# Patient Record
Sex: FEMALE | Race: WHITE | NOT HISPANIC OR LATINO | Employment: FULL TIME | URBAN - METROPOLITAN AREA
[De-identification: names, ages, dates, MRNs, and addresses within clinical notes are randomized per-mention and may not be internally consistent; named-entity substitution may affect disease eponyms.]

---

## 2021-05-30 ENCOUNTER — RECORDS - HEALTHEAST (OUTPATIENT)
Dept: ADMINISTRATIVE | Facility: CLINIC | Age: 31
End: 2021-05-30

## 2022-07-05 ENCOUNTER — LAB REQUISITION (OUTPATIENT)
Dept: LAB | Facility: CLINIC | Age: 32
End: 2022-07-05
Payer: COMMERCIAL

## 2022-07-05 DIAGNOSIS — Z32.01 ENCOUNTER FOR PREGNANCY TEST, RESULT POSITIVE: ICD-10-CM

## 2022-07-05 LAB
ABO/RH(D): NORMAL
ANTIBODY SCREEN: NEGATIVE
BASOPHILS # BLD AUTO: 0.1 10E3/UL (ref 0–0.2)
BASOPHILS NFR BLD AUTO: 0 %
EOSINOPHIL # BLD AUTO: 0.1 10E3/UL (ref 0–0.7)
EOSINOPHIL NFR BLD AUTO: 1 %
ERYTHROCYTE [DISTWIDTH] IN BLOOD BY AUTOMATED COUNT: 12.2 % (ref 10–15)
HCT VFR BLD AUTO: 38.9 % (ref 35–47)
HGB BLD-MCNC: 13.4 G/DL (ref 11.7–15.7)
IMM GRANULOCYTES # BLD: 0 10E3/UL
IMM GRANULOCYTES NFR BLD: 0 %
LYMPHOCYTES # BLD AUTO: 2.7 10E3/UL (ref 0.8–5.3)
LYMPHOCYTES NFR BLD AUTO: 21 %
MCH RBC QN AUTO: 30.7 PG (ref 26.5–33)
MCHC RBC AUTO-ENTMCNC: 34.4 G/DL (ref 31.5–36.5)
MCV RBC AUTO: 89 FL (ref 78–100)
MONOCYTES # BLD AUTO: 0.9 10E3/UL (ref 0–1.3)
MONOCYTES NFR BLD AUTO: 7 %
NEUTROPHILS # BLD AUTO: 9 10E3/UL (ref 1.6–8.3)
NEUTROPHILS NFR BLD AUTO: 71 %
NRBC # BLD AUTO: 0 10E3/UL
NRBC BLD AUTO-RTO: 0 /100
PLATELET # BLD AUTO: 303 10E3/UL (ref 150–450)
RBC # BLD AUTO: 4.36 10E6/UL (ref 3.8–5.2)
SPECIMEN EXPIRATION DATE: NORMAL
SPECIMEN EXPIRATION DATE: NORMAL
WBC # BLD AUTO: 12.7 10E3/UL (ref 4–11)

## 2022-07-05 PROCEDURE — 87086 URINE CULTURE/COLONY COUNT: CPT | Mod: ORL | Performed by: FAMILY MEDICINE

## 2022-07-05 PROCEDURE — 87389 HIV-1 AG W/HIV-1&-2 AB AG IA: CPT | Mod: ORL | Performed by: FAMILY MEDICINE

## 2022-07-05 PROCEDURE — 87591 N.GONORRHOEAE DNA AMP PROB: CPT | Mod: ORL | Performed by: FAMILY MEDICINE

## 2022-07-05 PROCEDURE — 86780 TREPONEMA PALLIDUM: CPT | Mod: ORL | Performed by: FAMILY MEDICINE

## 2022-07-05 PROCEDURE — 86850 RBC ANTIBODY SCREEN: CPT | Mod: ORL | Performed by: FAMILY MEDICINE

## 2022-07-05 PROCEDURE — 86901 BLOOD TYPING SEROLOGIC RH(D): CPT | Mod: ORL | Performed by: FAMILY MEDICINE

## 2022-07-05 PROCEDURE — 87340 HEPATITIS B SURFACE AG IA: CPT | Mod: ORL | Performed by: FAMILY MEDICINE

## 2022-07-05 PROCEDURE — 86803 HEPATITIS C AB TEST: CPT | Mod: ORL | Performed by: FAMILY MEDICINE

## 2022-07-05 PROCEDURE — 85025 COMPLETE CBC W/AUTO DIFF WBC: CPT | Mod: ORL | Performed by: FAMILY MEDICINE

## 2022-07-05 PROCEDURE — 86762 RUBELLA ANTIBODY: CPT | Mod: ORL | Performed by: FAMILY MEDICINE

## 2022-07-06 LAB
C TRACH DNA SPEC QL PROBE+SIG AMP: NEGATIVE
HBV SURFACE AG SERPL QL IA: NONREACTIVE
HCV AB SERPL QL IA: NONREACTIVE
HIV 1+2 AB+HIV1 P24 AG SERPL QL IA: NONREACTIVE
N GONORRHOEA DNA SPEC QL NAA+PROBE: NEGATIVE
RUBV IGG SERPL QL IA: 9.06 INDEX
RUBV IGG SERPL QL IA: POSITIVE
T PALLIDUM AB SER QL: NONREACTIVE

## 2022-07-07 LAB — BACTERIA UR CULT: NO GROWTH

## 2022-08-16 ENCOUNTER — TRANSFERRED RECORDS (OUTPATIENT)
Dept: HEALTH INFORMATION MANAGEMENT | Facility: CLINIC | Age: 32
End: 2022-08-16

## 2022-09-13 ENCOUNTER — TRANSFERRED RECORDS (OUTPATIENT)
Dept: HEALTH INFORMATION MANAGEMENT | Facility: CLINIC | Age: 32
End: 2022-09-13

## 2022-11-10 ENCOUNTER — LAB REQUISITION (OUTPATIENT)
Dept: LAB | Facility: CLINIC | Age: 32
End: 2022-11-10
Payer: COMMERCIAL

## 2022-11-10 ENCOUNTER — TRANSFERRED RECORDS (OUTPATIENT)
Dept: HEALTH INFORMATION MANAGEMENT | Facility: CLINIC | Age: 32
End: 2022-11-10

## 2022-11-10 DIAGNOSIS — Z34.02 ENCOUNTER FOR SUPERVISION OF NORMAL FIRST PREGNANCY, SECOND TRIMESTER: ICD-10-CM

## 2022-11-10 PROCEDURE — 86780 TREPONEMA PALLIDUM: CPT | Mod: ORL | Performed by: FAMILY MEDICINE

## 2022-11-11 LAB — T PALLIDUM AB SER QL: NONREACTIVE

## 2022-12-13 ENCOUNTER — TRANSFERRED RECORDS (OUTPATIENT)
Dept: HEALTH INFORMATION MANAGEMENT | Facility: CLINIC | Age: 32
End: 2022-12-13

## 2023-01-17 ENCOUNTER — LAB REQUISITION (OUTPATIENT)
Dept: LAB | Facility: CLINIC | Age: 33
End: 2023-01-17
Payer: COMMERCIAL

## 2023-01-17 ENCOUNTER — TRANSFERRED RECORDS (OUTPATIENT)
Dept: HEALTH INFORMATION MANAGEMENT | Facility: CLINIC | Age: 33
End: 2023-01-17

## 2023-01-17 DIAGNOSIS — Z34.03 ENCOUNTER FOR SUPERVISION OF NORMAL FIRST PREGNANCY, THIRD TRIMESTER: ICD-10-CM

## 2023-01-17 PROCEDURE — 87653 STREP B DNA AMP PROBE: CPT | Mod: ORL | Performed by: FAMILY MEDICINE

## 2023-01-18 LAB — GP B STREP DNA SPEC QL NAA+PROBE: NEGATIVE

## 2023-01-24 ENCOUNTER — TRANSFERRED RECORDS (OUTPATIENT)
Dept: HEALTH INFORMATION MANAGEMENT | Facility: CLINIC | Age: 33
End: 2023-01-24
Payer: COMMERCIAL

## 2023-02-13 ENCOUNTER — TRANSFERRED RECORDS (OUTPATIENT)
Dept: HEALTH INFORMATION MANAGEMENT | Facility: CLINIC | Age: 33
End: 2023-02-13
Payer: COMMERCIAL

## 2023-02-18 ENCOUNTER — HOSPITAL ENCOUNTER (INPATIENT)
Facility: HOSPITAL | Age: 33
LOS: 3 days | Discharge: HOME | End: 2023-02-21
Attending: FAMILY MEDICINE | Admitting: FAMILY MEDICINE
Payer: COMMERCIAL

## 2023-02-18 PROBLEM — Z37.9 NORMAL LABOR: Status: ACTIVE | Noted: 2023-02-18

## 2023-02-18 LAB
ABO/RH(D): NORMAL
ANTIBODY SCREEN: NEGATIVE
SPECIMEN EXPIRATION DATE: NORMAL

## 2023-02-18 PROCEDURE — 120N000001 HC R&B MED SURG/OB

## 2023-02-18 RX ORDER — KETOROLAC TROMETHAMINE 30 MG/ML
30 INJECTION, SOLUTION INTRAMUSCULAR; INTRAVENOUS
Status: DISCONTINUED | OUTPATIENT
Start: 2023-02-18 | End: 2023-02-21 | Stop reason: HOSPADM

## 2023-02-18 RX ORDER — OXYTOCIN 10 [USP'U]/ML
10 INJECTION, SOLUTION INTRAMUSCULAR; INTRAVENOUS
Status: DISCONTINUED | OUTPATIENT
Start: 2023-02-18 | End: 2023-02-19 | Stop reason: HOSPADM

## 2023-02-18 RX ORDER — PROCHLORPERAZINE MALEATE 10 MG
10 TABLET ORAL EVERY 6 HOURS PRN
Status: DISCONTINUED | OUTPATIENT
Start: 2023-02-18 | End: 2023-02-19 | Stop reason: HOSPADM

## 2023-02-18 RX ORDER — FENTANYL CITRATE 50 UG/ML
100 INJECTION, SOLUTION INTRAMUSCULAR; INTRAVENOUS
Status: DISCONTINUED | OUTPATIENT
Start: 2023-02-18 | End: 2023-02-19 | Stop reason: HOSPADM

## 2023-02-18 RX ORDER — OXYTOCIN/0.9 % SODIUM CHLORIDE 30/500 ML
100-340 PLASTIC BAG, INJECTION (ML) INTRAVENOUS CONTINUOUS PRN
Status: DISCONTINUED | OUTPATIENT
Start: 2023-02-18 | End: 2023-02-21 | Stop reason: HOSPADM

## 2023-02-18 RX ORDER — METOCLOPRAMIDE HYDROCHLORIDE 5 MG/ML
10 INJECTION INTRAMUSCULAR; INTRAVENOUS EVERY 6 HOURS PRN
Status: DISCONTINUED | OUTPATIENT
Start: 2023-02-18 | End: 2023-02-19 | Stop reason: HOSPADM

## 2023-02-18 RX ORDER — MISOPROSTOL 200 UG/1
400 TABLET ORAL
Status: DISCONTINUED | OUTPATIENT
Start: 2023-02-18 | End: 2023-02-19 | Stop reason: HOSPADM

## 2023-02-18 RX ORDER — ONDANSETRON 4 MG/1
4 TABLET, ORALLY DISINTEGRATING ORAL EVERY 6 HOURS PRN
Status: DISCONTINUED | OUTPATIENT
Start: 2023-02-18 | End: 2023-02-19 | Stop reason: HOSPADM

## 2023-02-18 RX ORDER — OXYTOCIN 10 [USP'U]/ML
10 INJECTION, SOLUTION INTRAMUSCULAR; INTRAVENOUS
Status: DISCONTINUED | OUTPATIENT
Start: 2023-02-18 | End: 2023-02-21 | Stop reason: HOSPADM

## 2023-02-18 RX ORDER — PROCHLORPERAZINE 25 MG
25 SUPPOSITORY, RECTAL RECTAL EVERY 12 HOURS PRN
Status: DISCONTINUED | OUTPATIENT
Start: 2023-02-18 | End: 2023-02-19 | Stop reason: HOSPADM

## 2023-02-18 RX ORDER — NALOXONE HYDROCHLORIDE 0.4 MG/ML
0.4 INJECTION, SOLUTION INTRAMUSCULAR; INTRAVENOUS; SUBCUTANEOUS
Status: DISCONTINUED | OUTPATIENT
Start: 2023-02-18 | End: 2023-02-19 | Stop reason: HOSPADM

## 2023-02-18 RX ORDER — NALOXONE HYDROCHLORIDE 0.4 MG/ML
0.2 INJECTION, SOLUTION INTRAMUSCULAR; INTRAVENOUS; SUBCUTANEOUS
Status: DISCONTINUED | OUTPATIENT
Start: 2023-02-18 | End: 2023-02-19 | Stop reason: HOSPADM

## 2023-02-18 RX ORDER — METOCLOPRAMIDE 10 MG/1
10 TABLET ORAL EVERY 6 HOURS PRN
Status: DISCONTINUED | OUTPATIENT
Start: 2023-02-18 | End: 2023-02-19 | Stop reason: HOSPADM

## 2023-02-18 RX ORDER — CARBOPROST TROMETHAMINE 250 UG/ML
250 INJECTION, SOLUTION INTRAMUSCULAR
Status: DISCONTINUED | OUTPATIENT
Start: 2023-02-18 | End: 2023-02-19 | Stop reason: HOSPADM

## 2023-02-18 RX ORDER — VITAMIN A ACETATE, .BETA.-CAROTENE, ASCORBIC ACID, CHOLECALCIFEROL, .ALPHA.-TOCOPHEROL ACETATE, DL-, THIAMINE MONONITRATE, RIBOFLAVIN, NIACINAMIDE, PYRIDOXINE HYDROCHLORIDE, FOLIC ACID, CYANOCOBALAMIN, CALCIUM CARBONATE, FERROUS FUMARATE, ZINC OXIDE, AND CUPRIC OXIDE 2000; 2000; 120; 400; 22; 1.84; 3; 20; 10; 1; 12; 200; 27; 25; 2 [IU]/1; [IU]/1; MG/1; [IU]/1; MG/1; MG/1; MG/1; MG/1; MG/1; MG/1; UG/1; MG/1; MG/1; MG/1; MG/1
1 TABLET ORAL DAILY
COMMUNITY

## 2023-02-18 RX ORDER — IBUPROFEN 800 MG/1
800 TABLET, FILM COATED ORAL
Status: DISCONTINUED | OUTPATIENT
Start: 2023-02-18 | End: 2023-02-21 | Stop reason: HOSPADM

## 2023-02-18 RX ORDER — ONDANSETRON 2 MG/ML
4 INJECTION INTRAMUSCULAR; INTRAVENOUS EVERY 6 HOURS PRN
Status: DISCONTINUED | OUTPATIENT
Start: 2023-02-18 | End: 2023-02-19 | Stop reason: HOSPADM

## 2023-02-18 RX ORDER — OXYTOCIN/0.9 % SODIUM CHLORIDE 30/500 ML
340 PLASTIC BAG, INJECTION (ML) INTRAVENOUS CONTINUOUS PRN
Status: DISCONTINUED | OUTPATIENT
Start: 2023-02-18 | End: 2023-02-19 | Stop reason: HOSPADM

## 2023-02-18 RX ORDER — CITRIC ACID/SODIUM CITRATE 334-500MG
30 SOLUTION, ORAL ORAL
Status: DISCONTINUED | OUTPATIENT
Start: 2023-02-18 | End: 2023-02-19 | Stop reason: HOSPADM

## 2023-02-18 RX ORDER — METHYLERGONOVINE MALEATE 0.2 MG/ML
200 INJECTION INTRAVENOUS
Status: DISCONTINUED | OUTPATIENT
Start: 2023-02-18 | End: 2023-02-19 | Stop reason: HOSPADM

## 2023-02-18 RX ORDER — MISOPROSTOL 200 UG/1
800 TABLET ORAL
Status: DISCONTINUED | OUTPATIENT
Start: 2023-02-18 | End: 2023-02-19 | Stop reason: HOSPADM

## 2023-02-18 ASSESSMENT — ACTIVITIES OF DAILY LIVING (ADL): ADLS_ACUITY_SCORE: 35

## 2023-02-19 ENCOUNTER — ANESTHESIA (OUTPATIENT)
Dept: OBGYN | Facility: HOSPITAL | Age: 33
End: 2023-02-19
Payer: COMMERCIAL

## 2023-02-19 ENCOUNTER — ANESTHESIA EVENT (OUTPATIENT)
Dept: OBGYN | Facility: HOSPITAL | Age: 33
End: 2023-02-19
Payer: COMMERCIAL

## 2023-02-19 LAB
HOLD SPECIMEN: NORMAL
T PALLIDUM AB SER QL: NONREACTIVE

## 2023-02-19 PROCEDURE — 250N000011 HC RX IP 250 OP 636: Performed by: ANESTHESIOLOGY

## 2023-02-19 PROCEDURE — 36415 COLL VENOUS BLD VENIPUNCTURE: CPT | Performed by: FAMILY MEDICINE

## 2023-02-19 PROCEDURE — 86850 RBC ANTIBODY SCREEN: CPT | Performed by: FAMILY MEDICINE

## 2023-02-19 PROCEDURE — 3E0R3BZ INTRODUCTION OF ANESTHETIC AGENT INTO SPINAL CANAL, PERCUTANEOUS APPROACH: ICD-10-PCS | Performed by: FAMILY MEDICINE

## 2023-02-19 PROCEDURE — 250N000011 HC RX IP 250 OP 636: Performed by: FAMILY MEDICINE

## 2023-02-19 PROCEDURE — 0KQM0ZZ REPAIR PERINEUM MUSCLE, OPEN APPROACH: ICD-10-PCS | Performed by: FAMILY MEDICINE

## 2023-02-19 PROCEDURE — 250N000009 HC RX 250: Performed by: FAMILY MEDICINE

## 2023-02-19 PROCEDURE — 370N000003 HC ANESTHESIA WARD SERVICE

## 2023-02-19 PROCEDURE — 86901 BLOOD TYPING SEROLOGIC RH(D): CPT | Performed by: FAMILY MEDICINE

## 2023-02-19 PROCEDURE — 722N000001 HC LABOR CARE VAGINAL DELIVERY SINGLE

## 2023-02-19 PROCEDURE — 258N000003 HC RX IP 258 OP 636: Performed by: FAMILY MEDICINE

## 2023-02-19 PROCEDURE — 86780 TREPONEMA PALLIDUM: CPT | Performed by: FAMILY MEDICINE

## 2023-02-19 PROCEDURE — 00HU33Z INSERTION OF INFUSION DEVICE INTO SPINAL CANAL, PERCUTANEOUS APPROACH: ICD-10-PCS | Performed by: FAMILY MEDICINE

## 2023-02-19 PROCEDURE — 120N000001 HC R&B MED SURG/OB

## 2023-02-19 RX ORDER — BUPIVACAINE HYDROCHLORIDE 2.5 MG/ML
INJECTION, SOLUTION EPIDURAL; INFILTRATION; INTRACAUDAL
Status: COMPLETED | OUTPATIENT
Start: 2023-02-19 | End: 2023-02-19

## 2023-02-19 RX ORDER — IBUPROFEN 800 MG/1
800 TABLET, FILM COATED ORAL EVERY 6 HOURS PRN
Status: DISCONTINUED | OUTPATIENT
Start: 2023-02-19 | End: 2023-02-21 | Stop reason: HOSPADM

## 2023-02-19 RX ORDER — ONDANSETRON 4 MG/1
4 TABLET, ORALLY DISINTEGRATING ORAL EVERY 6 HOURS PRN
Status: DISCONTINUED | OUTPATIENT
Start: 2023-02-19 | End: 2023-02-19 | Stop reason: HOSPADM

## 2023-02-19 RX ORDER — HYDROCORTISONE 25 MG/G
CREAM TOPICAL 3 TIMES DAILY PRN
Status: DISCONTINUED | OUTPATIENT
Start: 2023-02-19 | End: 2023-02-21 | Stop reason: HOSPADM

## 2023-02-19 RX ORDER — METHYLERGONOVINE MALEATE 0.2 MG/ML
200 INJECTION INTRAVENOUS
Status: DISCONTINUED | OUTPATIENT
Start: 2023-02-19 | End: 2023-02-21 | Stop reason: HOSPADM

## 2023-02-19 RX ORDER — ACETAMINOPHEN 325 MG/1
650 TABLET ORAL EVERY 4 HOURS PRN
Status: DISCONTINUED | OUTPATIENT
Start: 2023-02-19 | End: 2023-02-21 | Stop reason: HOSPADM

## 2023-02-19 RX ORDER — ONDANSETRON 2 MG/ML
4 INJECTION INTRAMUSCULAR; INTRAVENOUS EVERY 6 HOURS PRN
Status: DISCONTINUED | OUTPATIENT
Start: 2023-02-19 | End: 2023-02-19 | Stop reason: HOSPADM

## 2023-02-19 RX ORDER — OXYTOCIN/0.9 % SODIUM CHLORIDE 30/500 ML
340 PLASTIC BAG, INJECTION (ML) INTRAVENOUS CONTINUOUS PRN
Status: DISCONTINUED | OUTPATIENT
Start: 2023-02-19 | End: 2023-02-21 | Stop reason: HOSPADM

## 2023-02-19 RX ORDER — FENTANYL CITRATE-0.9 % NACL/PF 10 MCG/ML
100 PLASTIC BAG, INJECTION (ML) INTRAVENOUS EVERY 5 MIN PRN
Status: DISCONTINUED | OUTPATIENT
Start: 2023-02-19 | End: 2023-02-19 | Stop reason: HOSPADM

## 2023-02-19 RX ORDER — MISOPROSTOL 200 UG/1
400 TABLET ORAL
Status: DISCONTINUED | OUTPATIENT
Start: 2023-02-19 | End: 2023-02-21 | Stop reason: HOSPADM

## 2023-02-19 RX ORDER — MISOPROSTOL 200 UG/1
800 TABLET ORAL
Status: DISCONTINUED | OUTPATIENT
Start: 2023-02-19 | End: 2023-02-21 | Stop reason: HOSPADM

## 2023-02-19 RX ORDER — MODIFIED LANOLIN
OINTMENT (GRAM) TOPICAL
Status: DISCONTINUED | OUTPATIENT
Start: 2023-02-19 | End: 2023-02-21 | Stop reason: HOSPADM

## 2023-02-19 RX ORDER — OXYTOCIN 10 [USP'U]/ML
10 INJECTION, SOLUTION INTRAMUSCULAR; INTRAVENOUS
Status: DISCONTINUED | OUTPATIENT
Start: 2023-02-19 | End: 2023-02-21 | Stop reason: HOSPADM

## 2023-02-19 RX ORDER — BISACODYL 10 MG
10 SUPPOSITORY, RECTAL RECTAL DAILY PRN
Status: DISCONTINUED | OUTPATIENT
Start: 2023-02-19 | End: 2023-02-21 | Stop reason: HOSPADM

## 2023-02-19 RX ORDER — OXYTOCIN/0.9 % SODIUM CHLORIDE 30/500 ML
1-24 PLASTIC BAG, INJECTION (ML) INTRAVENOUS CONTINUOUS
Status: DISCONTINUED | OUTPATIENT
Start: 2023-02-19 | End: 2023-02-19 | Stop reason: HOSPADM

## 2023-02-19 RX ORDER — NALBUPHINE HYDROCHLORIDE 10 MG/ML
2.5-5 INJECTION, SOLUTION INTRAMUSCULAR; INTRAVENOUS; SUBCUTANEOUS EVERY 6 HOURS PRN
Status: DISCONTINUED | OUTPATIENT
Start: 2023-02-19 | End: 2023-02-21 | Stop reason: HOSPADM

## 2023-02-19 RX ORDER — CARBOPROST TROMETHAMINE 250 UG/ML
250 INJECTION, SOLUTION INTRAMUSCULAR
Status: DISCONTINUED | OUTPATIENT
Start: 2023-02-19 | End: 2023-02-21 | Stop reason: HOSPADM

## 2023-02-19 RX ORDER — LIDOCAINE 40 MG/G
CREAM TOPICAL
Status: DISCONTINUED | OUTPATIENT
Start: 2023-02-19 | End: 2023-02-19 | Stop reason: HOSPADM

## 2023-02-19 RX ORDER — SODIUM CHLORIDE, SODIUM LACTATE, POTASSIUM CHLORIDE, CALCIUM CHLORIDE 600; 310; 30; 20 MG/100ML; MG/100ML; MG/100ML; MG/100ML
INJECTION, SOLUTION INTRAVENOUS CONTINUOUS PRN
Status: DISCONTINUED | OUTPATIENT
Start: 2023-02-19 | End: 2023-02-19 | Stop reason: HOSPADM

## 2023-02-19 RX ORDER — FENTANYL/ROPIVACAINE/NS/PF 2MCG/ML-.1
PLASTIC BAG, INJECTION (ML) EPIDURAL
Status: DISCONTINUED | OUTPATIENT
Start: 2023-02-19 | End: 2023-02-19 | Stop reason: HOSPADM

## 2023-02-19 RX ORDER — DOCUSATE SODIUM 100 MG/1
100 CAPSULE, LIQUID FILLED ORAL DAILY
Status: DISCONTINUED | OUTPATIENT
Start: 2023-02-20 | End: 2023-02-21 | Stop reason: HOSPADM

## 2023-02-19 RX ADMIN — Medication 9 ML/HR: at 12:40

## 2023-02-19 RX ADMIN — Medication 2 MILLI-UNITS/MIN: at 16:55

## 2023-02-19 RX ADMIN — ONDANSETRON 4 MG: 2 INJECTION INTRAMUSCULAR; INTRAVENOUS at 09:18

## 2023-02-19 RX ADMIN — SODIUM CHLORIDE, POTASSIUM CHLORIDE, SODIUM LACTATE AND CALCIUM CHLORIDE 500 ML: 600; 310; 30; 20 INJECTION, SOLUTION INTRAVENOUS at 11:51

## 2023-02-19 RX ADMIN — SODIUM CHLORIDE, POTASSIUM CHLORIDE, SODIUM LACTATE AND CALCIUM CHLORIDE: 600; 310; 30; 20 INJECTION, SOLUTION INTRAVENOUS at 16:03

## 2023-02-19 RX ADMIN — FENTANYL CITRATE 100 MCG: 50 INJECTION, SOLUTION INTRAMUSCULAR; INTRAVENOUS at 00:33

## 2023-02-19 RX ADMIN — FENTANYL CITRATE 100 MCG: 50 INJECTION, SOLUTION INTRAMUSCULAR; INTRAVENOUS at 02:55

## 2023-02-19 RX ADMIN — FENTANYL CITRATE 100 MCG: 50 INJECTION, SOLUTION INTRAMUSCULAR; INTRAVENOUS at 05:07

## 2023-02-19 RX ADMIN — BUPIVACAINE HYDROCHLORIDE 10 ML: 2.5 INJECTION, SOLUTION EPIDURAL; INFILTRATION; INTRACAUDAL at 13:00

## 2023-02-19 RX ADMIN — METOCLOPRAMIDE 10 MG: 5 INJECTION, SOLUTION INTRAMUSCULAR; INTRAVENOUS at 05:15

## 2023-02-19 RX ADMIN — Medication: at 13:04

## 2023-02-19 RX ADMIN — BUPIVACAINE HYDROCHLORIDE 10 ML: 2.5 INJECTION, SOLUTION EPIDURAL; INFILTRATION; INTRACAUDAL at 12:36

## 2023-02-19 ASSESSMENT — ACTIVITIES OF DAILY LIVING (ADL)
VISION_MANAGEMENT: GLASSES
DOING_ERRANDS_INDEPENDENTLY_DIFFICULTY: NO
WEAR_GLASSES_OR_BLIND: YES
CONCENTRATING,_REMEMBERING_OR_MAKING_DECISIONS_DIFFICULTY: NO
NUMBER_OF_TIMES_PATIENT_HAS_FALLEN_WITHIN_LAST_SIX_MONTHS: 1
ADLS_ACUITY_SCORE: 20
DRESSING/BATHING_DIFFICULTY: NO
ADLS_ACUITY_SCORE: 20
CHANGE_IN_FUNCTIONAL_STATUS_SINCE_ONSET_OF_CURRENT_ILLNESS/INJURY: NO
ADLS_ACUITY_SCORE: 20
DIFFICULTY_EATING/SWALLOWING: NO
TOILETING_ISSUES: NO
WALKING_OR_CLIMBING_STAIRS_DIFFICULTY: NO
ADLS_ACUITY_SCORE: 20
ADLS_ACUITY_SCORE: 20
HEARING_DIFFICULTY_OR_DEAF: NO
DIFFICULTY_COMMUNICATING: NO
FALL_HISTORY_WITHIN_LAST_SIX_MONTHS: YES
ADLS_ACUITY_SCORE: 20

## 2023-02-19 NOTE — PROGRESS NOTES
At 13:12, FHT began a deceleration lasting 10 minutes, which spontaneously resolved. This was shortly after patient received epidural, however, BP WNL. SVE relatively unchanged at 7/70/-2. Dr. Hutchins notified. No further orders at this time.

## 2023-02-19 NOTE — PROGRESS NOTES
RN spoke with Dr. Hutchins re: status update. SVE unchanged from previous check. FHT Cat 1. Ctx q 5-7 minutes. Per MD, ok to start pitocin.    RN discussed POC with patient and her , and they verbalized agreement and understanding.

## 2023-02-19 NOTE — PROGRESS NOTES
RN at bedside. Patient up to the bathroom. Patient breathing through contractions. RN suggested trying tub vs another dose of fentanyl. Patient states she prefers to stay in bed for now.    RN will continue to monitor.

## 2023-02-19 NOTE — ANESTHESIA PROCEDURE NOTES
"Epidural catheter Procedure Note    Pre-Procedure   Staff -        Anesthesiologist:  Yasmin Ovalles MD       Performed By: anesthesiologist       Location: OB       Procedure Start/Stop Times: 2/19/2023 12:43 PM and 2/19/2023 1:06 PM       Pre-Anesthestic Checklist: patient identified, IV checked, risks and benefits discussed, informed consent, monitors and equipment checked, pre-op evaluation, at physician/surgeon's request and post-op pain management  Timeout:       Correct Patient: Yes        Correct Procedure: Yes        Correct Site: Yes        Correct Position: Yes   Procedure Documentation  Procedure: epidural catheter       Diagnosis: Labor Pain       Patient Position: sitting       Skin prep: Betadine       Local skin infiltrated with mL of 1% lidocaine.        Insertion Site: L4-5. (midline approach).       Technique: LORT air        Needle Type: [employer] needle       Needle Gauge: 18.        Needle Length (Inches): 3.5        Catheter: 20 G.          Catheter threaded easily.             # of attempts: 1 and  # of redirects:  0    Assessment/Narrative         Paresthesias: No.       Test dose of 3 mL lidocaine 1.5% w/ 1:200,000 epinephrine at.         Test dose negative, 3 minutes after injection, for signs of intravascular, subdural, or intrathecal injection.       Insertion/Infusion Method: LORT air       Aspiration negative for Heme or CSF via Epidural Catheter.    Medication(s) Administered   0.25% Bupivacaine PF (Epidural) - EPIDURAL   10 mL - 2/19/2023 12:36:00 PM  Medication Administration Time: 2/19/2023 12:43 PM      FOR [facility] ([facility]) ONLY:   Pain Team Contact information: please page the Pain Team Via [employer]. Search \"Pain\". During daytime hours, please page the attending first. At night please page the resident first.    "

## 2023-02-19 NOTE — PROGRESS NOTES
RN at bedside. Patient nauseous, and requesting IV zofran. Patient also decided to try using tub. RN assisted patient with filling tub, and to ambulate to bathroom. SO also assisting patient to get into tub. RN educated patient and her SO on availability of call light near tub. Both verbalized understanding.

## 2023-02-19 NOTE — PROGRESS NOTES
RN at bedside. Patient out of tub and laboring in bed. Per Dr. Hutchins, SVE 6-7 cm, BOW intact.     Patient states she wants more pain relief, but undecided on IV narcotic vs epidural. She states that she wants to wait until her SO returns to bedside to make the decision.

## 2023-02-19 NOTE — H&P
Maternal Admission H&P  [facility]  Date of Admission: 2023  Date of Service: 2023    Name      Ericka Hernández         1990  MRN       [id number]  PCP        Jose Alberto Hutchins         Assessment and Plan  33 year old  at 40w6d in labor.    Anticipate . Allow he rto labor down     Analgesia per patient's wishes    Group B strep: negative    Fentanyl or analgesia      Chief Complaint  contractions    HPI  Ericka Hernández is a 33 year old woman at 40w6d, based on an Estimated Date of Delivery: 2023. She presents with contractions q 3-5 minutes and 2 cm dilated. No HA or swelling. Her  Contractions began earlier that day and became more intense over time. She did have a NST last week that was reactive. Her pregnancy was uneventful, GBS negative.    Patient Active Problem List    Diagnosis Date Noted     Normal labor 2023     Priority: Medium      OB History    Para Term  AB Living   1 0 0 0 0 0   SAB IAB Ectopic Multiple Live Births   0 0 0 0 0      # Outcome Date GA Lbr [place]/2nd Weight Sex Delivery Anes PTL Lv   1 Current                Review of Systems   Negative except what is noted in HPI.     Past Medical History  History reviewed. No pertinent past medical history.     Past Surgical History  History reviewed. No pertinent surgical history.    Allergies  Patient has no known allergies.    Family History  History reviewed. No pertinent family history.    Social History  I have reviewed this patient's social history and updated it with pertinent information if needed. Ericka Hernández  reports that she quit smoking about 9 months ago. Her smoking use included cigarettes. She has never used smokeless tobacco. She reports that she does not currently use alcohol. She reports that she does not use drugs.    Prior to Admission Medication List  Medications Prior to Admission   Medication Sig Dispense Refill Last Dose      "Prenatal Vit-Fe Fumarate-FA (PNV PRENATAL PLUS MULTIVITAMIN) 27-1 MG TABS per tablet Take 1 tablet by mouth daily   2/18/2023        Allergies  No Known Allergies    There is no immunization history on file for this patient.     Physical Exam  /70 (BP Location: Right arm, Patient Position: Left side, Cuff Size: Adult Regular)   Pulse 66   Temp 97.9  F (36.6  C) (Oral)   Resp 18   Ht 1.702 m (5' 7\")   Wt 97.5 kg (215 lb)   SpO2 97%   Breastfeeding Yes   BMI 33.67 kg/m    HEART: RRR, no murmur  LUNGS: CTA bilaterally  Fetal Heart Tones:  moderate variablility, + accels, no decels and Category I (per nursing report)  CONTRACTIONS:  frequency q 3-4 minutes  CERVIX: 6-7/ 90%/ vtx/ 0  FLUID: None noted.  Fetal Presentation: vertex.    Labs  Group B Strep PCR   Date Value Ref Range Status   01/17/2023 Negative Negative Final     Comment:     Presumed negative for Streptococcus agalactiae (Group B Streptococcus) or the number of organisms may be below the limit of detection of the assay.      Antibody Screen   Date Value Ref Range Status   02/19/2023 Negative Negative Final     Hepatitis B Surface Antigen   Date Value Ref Range Status   07/05/2022 Nonreactive Nonreactive Final     Hemoglobin   Date Value Ref Range Status   07/05/2022 13.4 11.7 - 15.7 g/dL Final      Admission on 02/18/2023   Component Date Value Ref Range Status     ABO/RH(D) 02/19/2023 A POS   Final     Antibody Screen 02/19/2023 Negative  Negative Final     SPECIMEN EXPIRATION DATE 02/19/2023 [id number]   Final     Hold Specimen 02/19/2023 [facility]   Final        Completed by:   Jose Alberto Hutchins MD  [facility]  2/19/2023 10:34 AM  "

## 2023-02-19 NOTE — PLAN OF CARE
Problem: Plan of Care - These are the overarching goals to be used throughout the patient stay.    Goal: Optimal Comfort and Wellbeing  Intervention: Monitor Pain and Promote Comfort  Recent Flowsheet Documentation  Taken 2/19/2023 0033 by Miriam Wood RN  Pain Management Interventions: medication (see MAR)  Taken 2/18/2023 2324 by Miriam Wood RN  Pain Management Interventions: breathing exercises  Intervention: Provide Person-Centered Care  Recent Flowsheet Documentation  Taken 2/18/2023 2321 by Miriam Wood RN  Trust Relationship/Rapport:   care explained   emotional support provided  Goal: Readiness for Transition of Care  Intervention: Mutually Develop Transition Plan  Recent Flowsheet Documentation  Taken 2/19/2023 0010 by Miriam Wood RN  Equipment Currently Used at Home: none

## 2023-02-19 NOTE — ANESTHESIA PREPROCEDURE EVALUATION
Anesthesia Pre-Procedure Evaluation    Patient: Ericka Hernández   MRN: [id number] : 1990        Procedure : * No procedures listed *          History reviewed. No pertinent past medical history.   History reviewed. No pertinent surgical history.   No Known Allergies   Social History     Tobacco Use     Smoking status: Former     Types: Cigarettes     Quit date: 2022     Years since quittin.7     Smokeless tobacco: Never   Substance Use Topics     Alcohol use: Not Currently      Wt Readings from Last 1 Encounters:   23 97.5 kg (215 lb)        Anesthesia Evaluation   Pt has had prior anesthetic.     No history of anesthetic complications       ROS/MED HX  ENT/Pulmonary:       Neurologic:       Cardiovascular:       METS/Exercise Tolerance:     Hematologic:       Musculoskeletal:       GI/Hepatic:       Renal/Genitourinary:       Endo:     (+) Obesity,     Psychiatric/Substance Use:       Infectious Disease:       Malignancy:       Other:      (+) Possibly pregnant, ,            OUTSIDE LABS:  CBC:   Lab Results   Component Value Date    WBC 12.7 (H) 2022    HGB 13.4 2022    HCT 38.9 2022     2022     BMP: No results found for: NA, POTASSIUM, CHLORIDE, CO2, BUN, CR, GLC  COAGS: No results found for: PTT, INR, FIBR  POC: No results found for: BGM, HCG, HCGS  HEPATIC: No results found for: ALBUMIN, PROTTOTAL, ALT, AST, GGT, ALKPHOS, BILITOTAL, BILIDIRECT, BELIA  OTHER: No results found for: PH, LACT, A1C, SHARAN, PHOS, MAG, LIPASE, AMYLASE, TSH, T4, T3, CRP, SED    Anesthesia Plan    ASA Status:  2      Anesthesia Type: Epidural.              Consents    Anesthesia Plan(s) and associated risks, benefits, and realistic alternatives discussed. Questions answered and patient/representative(s) expressed understanding.    - Discussed:     - Discussed with:  Patient         Postoperative Care            Comments:                Yasmin Ovalles MD

## 2023-02-19 NOTE — PROGRESS NOTES
RN in room. Patient still in tub. Patient states that Dr. Hutchins had stopped in the room to check on her. Patient denies any further needs at this time.    RN will continue to monitor.

## 2023-02-20 LAB — HGB BLD-MCNC: 11.5 G/DL (ref 11.7–15.7)

## 2023-02-20 PROCEDURE — 120N000001 HC R&B MED SURG/OB

## 2023-02-20 PROCEDURE — 36415 COLL VENOUS BLD VENIPUNCTURE: CPT | Performed by: FAMILY MEDICINE

## 2023-02-20 PROCEDURE — 722N000001 HC LABOR CARE VAGINAL DELIVERY SINGLE

## 2023-02-20 PROCEDURE — 250N000013 HC RX MED GY IP 250 OP 250 PS 637: Performed by: FAMILY MEDICINE

## 2023-02-20 PROCEDURE — 85018 HEMOGLOBIN: CPT | Performed by: FAMILY MEDICINE

## 2023-02-20 RX ADMIN — IBUPROFEN 800 MG: 800 TABLET ORAL at 02:32

## 2023-02-20 RX ADMIN — IBUPROFEN 800 MG: 800 TABLET ORAL at 16:59

## 2023-02-20 RX ADMIN — DOCUSATE SODIUM 100 MG: 100 CAPSULE, LIQUID FILLED ORAL at 09:14

## 2023-02-20 ASSESSMENT — ACTIVITIES OF DAILY LIVING (ADL)
ADLS_ACUITY_SCORE: 20
ADLS_ACUITY_SCORE: 24
ADLS_ACUITY_SCORE: 24
ADLS_ACUITY_SCORE: 20

## 2023-02-20 NOTE — PLAN OF CARE
VSS, progressing WDL, denied pain, encouraged to breastfeed then pump, since Nipple is flat, uses nipple shield, lactations consult is received, continue to monitor per unit routine, no concern at the time.     Problem: Postpartum (Vaginal Delivery)  Goal: Hemostasis  Outcome: Adequate for Care Transition  Goal: Effective Urinary Elimination  Outcome: Adequate for Care Transition     Problem: Plan of Care - These are the overarching goals to be used throughout the patient stay.    Goal: Optimal Comfort and Wellbeing  Intervention: Provide Person-Centered Care  Recent Flowsheet Documentation  Taken 2/20/2023 0830 by Mehreen Brenner, RN  Trust Relationship/Rapport:    care explained    choices provided    thoughts/feelings acknowledged    questions answered    emotional support provided    questions encouraged     Problem: Labor  Goal: Acceptable Pain Control  Intervention: Support Labor Pain Coping and Management  Recent Flowsheet Documentation  Taken 2/20/2023 0830 by Mehreen Brenner, RN  Sensory Stimulation Regulation:    quiet environment promoted    care clustered

## 2023-02-20 NOTE — PROGRESS NOTES
"Maternal Postpartum Progress Note  [facility]  Date of Service: 2023    Name      Ericka Hernández         1990  MRN       [id number]  PCP        Jose Alberto Hutchins        Subjective:  The patient feels well:  Voiding without difficulty, lochia normal, tolerating normal diet, and passing flatus.  Pain is well controlled with current medications.  The patient has no emotional concerns.  No calf pain or swelling.  The baby is well and being fed by breast, working on latch.    Objective:  /85   Pulse 106   Temp 98.1  F (36.7  C) (Oral)   Resp 18   Ht 1.702 m (5' 7\")   Wt 97.5 kg (215 lb)   SpO2 97%   Breastfeeding Yes   BMI 33.67 kg/m    Lochia is minimal.  The uterine fundus is firm at umbilicus.  Urinary output is adequate. No calf tenderness.  No edema.    Labs:  Hemoglobin   Date Value Ref Range Status   2023 11.5 (L) 11.7 - 15.7 g/dL Final       Assessment:    -Postpartum Day 1 s/p vaginal delivery  -Normal postpartum course.      Plan:    -Continue current care.  -working on Brf, lactation to see    Completed by:   Paola Ozuna MD,   [facility]  2023 9:01 AM    "

## 2023-02-20 NOTE — L&D DELIVERY NOTE
[facility] Delivery Summary  [facility]  Date of Service: 2023    Name      Ericka Hernández         1990  MRN       [id number]  PCP        Jose Alberto Hutchins     DELIVERY NARRATIVE    On 2023 Ericka Hernández delivered a viable male infant at 40w6d with apgars of 8 and 10 via Vaginal, Spontaneous Delivery.    Ericka presented to Maternity Care on 2023 with active contractions and dilated to 2 cm. Her group B Strep (GBS) carrier status was negative.. She received oxytocin to 4 mU for augmentation when she stalled at 6-7 cm. She had SROM with clear fluid and she was complete shortly after.    Delivery was via vaginal, spontaneous  to a sterile field under intravenous ;epidural  anesthesia. Infant delivered in vertex  left  occiput  anterior  position. Shoulders delivered without difficulty. The baby was placed on the patient's abdomen.  Cord complications: none . Delayed cord clamping was performed.  The cord was doubly clamped and cut 3 vessels  were noted.     Placenta delivered at 2023  9:07 PM . Placental disposition was Hospital disposal . Fundal massage performed and fundus found to be  firm. The following uterotonics were given: Pitocin (IV). Perineum, vagina, cervix were inspected, and the following lacerations were noted: 2nd degree perineal. Lacerations were repaired in the usual fashion using 3-0 Vicryl. QBL: 400 mL. A vaginal laceration and periurethral laceration on her left were repaired also.     Excellent hemostasis was noted. Needle and sponge count correct. Infant and patient in delivery room in good and stable condition.   _________________    GA: 40w6d  GP:   Labor Complications: None   Additional Complications:    QBL: 400 mL  Delivery Type: Vaginal, Spontaneous   Duration of Ruptured Membranes: 0h 48m  GBS Status:   Group B Strep PCR   Date Value Ref Range Status   2023 Negative Negative Final     Comment:      Presumed negative for Streptococcus agalactiae (Group B Streptococcus) or the number of organisms may be below the limit of detection of the assay.       Weight:    Apgar scores: 8 , 10         Jailene Hernández [[id number]]    Labor Event Times    Labor onset date: 23 Onset time: 12:33 AM   Dilation complete date: 23 Complete time:  8:57 PM      Labor Events     labor?: No   steroids: None  Labor Type: Spontaneous  Predominate monitoring during 1st stage: continuous electronic fetal monitoring     Antibiotics received during labor?: No     Rupture identifier: Sac 1  Rupture date/time: 23   Rupture type: Spontaneous rupture of membranes occuring during spontaneous labor or augmentation  Fluid color: Clear  Fluid odor: Normal     Augmentation: Oxytocin  Indications for augmentation: Ineffective Contraction Pattern     Delivery/Placenta Date and Time    Delivery Date: 23 Delivery Time:  9:03 PM   Placenta Date/Time: 2023  9:07 PM  Oxytocin given at the time of delivery: after delivery of baby  Delivering clinician: Jose Alberto Hutchins   Other personnel present at delivery:  Provider Role   Covert, DOROTHY Pineda Clark, RN          Vaginal Counts     Initial count performed by 2 team members:  Two Team Members   Cuong Soto       Needles Suture Needles Sponges (RETIRED) Instruments   Initial counts   5    Added to count  2     Relief counts       Final counts  2 5          Placed during labor Accounted for at the end of labor   FSE NA NA   IUPC NA NA   Cervidil NA NA              Final count performed by 2 team members:  Two Team Members   Cuong Soto      Final count correct?: Yes     Apgars    Living status: Living   1 Minute 5 Minute 10 Minute 15 Minute 20 Minute   Skin color: 0  2       Heart rate: 2  2       Reflex irritability: 2  2       Muscle tone: 2  2       Respiratory effort: 2  2       Total: 8  10       Apgars assigned by:  REGINA DE LA CRUZ     Cord    Vessels: 3 Vessels    Cord Complications: None               Cord Blood Disposition: Discard    Gases Sent?: No    Delayed cord clamping?: Yes    Cord Clamping Delay (seconds):  seconds    Stem cell collection?: No       [place] Resuscitation    Methods: None  Output in Delivery Room: Voided      Measurements    Output in delivery room: Voided     Skin to Skin and Feeding Plan    Skin to skin initiation date/time: 1841    Skin to skin with: Mother  Skin to skin end date/time:        Labor Events and Shoulder Dystocia    Fetal Tracing Prior to Delivery: Category 2     Delivery (Maternal) (Provider to Complete) (588122)    Episiotomy: None  Perineal lacerations: 2nd    Periurethral laceration: bilateral Repaired?: Yes   Vaginal laceration?: Yes Repaired?: Yes   Repair suture: 3-0 Vicryl  Genital tract inspection done: Pos     Blood Loss  Mother: Ericka Hernández #[id number]   Start of Mother's Information    Delivery Blood Loss  23 0033 - 23 2154    Delivery QBL (mL) Hospital Encounter 400 mL    Total  400 mL         End of Mother's Information  Mother: Ericka Hernández #[id number]          Delivery - Provider to Complete ([id number])    Delivering clinician: Jose Alberto Hutchins  Delivery Type (Choose the 1 that will go to the Birth History): Vaginal, Spontaneous                   Other personnel:  Provider Role   Covert, DOROTHY Pineda Clark, RN                 Placenta    Date/Time: 2023  9:07 PM  Removal: Spontaneous  Disposition: Hospital disposal           Anesthesia    Method: INTRAVENOUS , Epidural  Cervical dilation at placement: 4-7     Analgesic:  BIRTH HISTORY: ANALGESIC   FENTANYL CITRATE IV             Presentation and Position    Presentation: Vertex    Position: Left Occiput Anterior                 Completed by:   Jose Alberto Hutchins MD  [facility]  2023 9:54 PM

## 2023-02-20 NOTE — LACTATION NOTE
"This note was copied from a baby's chart.  This writer met with Ericka \"Nirali\" per lactation order.  Infant has struggled to latch onto the breast.  Infant has latched with the nipple shield.  Nirali re-educated on hand expression.  She was able to hand express 0.5 ml colostrum from her right breast with ease.  Nirali and infant comfortably placed with infant in the football hold.  Latch technique discussed.  Infant able to latch, however, infant unable to sustain the latch.  Infant slips off the nipple and no swallows heard.  Several attempts made with no swallows hears.  Use of 24 mm nipple shield reviewed.  Nirali correctly placed this onto her right nipple.  Infant able to latch deeply and suck actively with several swallows heard.  Swallows increased when breast compression was used.  Infant then able to latch onto the other breast with the nipple shield (unable to sustain a latch without the nipple shield) and several swallows heard.  Infant content after the feeding.  Nirali knows to feed infant at least 8 times in 24 hours.  Try to latch without the nipple shield; however, if infant unable to swallow at the breast, then use the nipple shield.  Nirali verbalizes understanding of all education given.  She denies any further questions.      "

## 2023-02-20 NOTE — LACTATION NOTE
This note was copied from a baby's chart.  This writer knocked and went into the room to offer Ericka lactation services.  She was in bed with her baby soundly sleeping with baby in her arms.  This writer woke Ericka and informed her, for safety, infant needs to be in the crib while she sleeps.  This writer wrapped infant and placed infant on his back in the crib.      Ericka instructed to call when she desires lactation help for next breastfeeding session.

## 2023-02-20 NOTE — PLAN OF CARE
Problem: Labor  Goal: Stable Fetal Wellbeing  Outcome: Met  Intervention: Promote and Monitor Fetal Wellbeing  Flowsheets (Taken 2/19/2023 2313)  Fetal Wellbeing Promotion: fetal heart rate monitored  Goal: Effective Progression to Delivery  Outcome: Met  Intervention: Evaluate and Support Labor Progression  Flowsheets (Taken 2/19/2023 2313)  Shoulder Dystocia: (delivered) other (see comments)  Goal: Acceptable Pain Control  Outcome: Met

## 2023-02-20 NOTE — PLAN OF CARE
Problem: Plan of Care - These are the overarching goals to be used throughout the patient stay.    Goal: Plan of Care Review  Description: The Plan of Care Review/Shift note should be completed every shift.  The Outcome Evaluation is a brief statement about your assessment that the patient is improving, declining, or no change.  This information will be displayed automatically on your shift note.  Outcome: Progressing  Flowsheets (Taken 2/20/2023 0247)  Plan of Care Reviewed With: patient     Problem: Postpartum (Vaginal Delivery)  Goal: Effective Urinary Elimination  Outcome: Progressing   Patient was assisted to the bathroom, voided, wilton care done, New sanitory pad, witch hazel pads and spray applied to perineum. She received Motrin 800 mg for perineal pain she rates pain 4/10. Mom has flat nipple, electric breast pump offered. Parents bonding well with baby Cassidy Brooks RN

## 2023-02-21 VITALS
WEIGHT: 215 LBS | DIASTOLIC BLOOD PRESSURE: 82 MMHG | BODY MASS INDEX: 33.74 KG/M2 | HEIGHT: 67 IN | HEART RATE: 84 BPM | RESPIRATION RATE: 18 BRPM | SYSTOLIC BLOOD PRESSURE: 125 MMHG | OXYGEN SATURATION: 97 % | TEMPERATURE: 98.7 F

## 2023-02-21 PROCEDURE — 250N000013 HC RX MED GY IP 250 OP 250 PS 637: Performed by: FAMILY MEDICINE

## 2023-02-21 RX ORDER — DOCUSATE SODIUM 100 MG/1
100 CAPSULE, LIQUID FILLED ORAL DAILY
COMMUNITY
Start: 2023-02-22

## 2023-02-21 RX ORDER — ACETAMINOPHEN 325 MG/1
650 TABLET ORAL EVERY 4 HOURS PRN
COMMUNITY
Start: 2023-02-21

## 2023-02-21 RX ORDER — MODIFIED LANOLIN
OINTMENT (GRAM) TOPICAL
COMMUNITY
Start: 2023-02-21

## 2023-02-21 RX ORDER — IBUPROFEN 800 MG/1
800 TABLET, FILM COATED ORAL EVERY 6 HOURS PRN
COMMUNITY
Start: 2023-02-21

## 2023-02-21 RX ADMIN — DOCUSATE SODIUM 100 MG: 100 CAPSULE, LIQUID FILLED ORAL at 09:13

## 2023-02-21 ASSESSMENT — ACTIVITIES OF DAILY LIVING (ADL)
ADLS_ACUITY_SCORE: 20

## 2023-02-21 NOTE — PLAN OF CARE
"  Problem: Plan of Care - These are the overarching goals to be used throughout the patient stay.    Goal: Plan of Care Review  Description: The Plan of Care Review/Shift note should be completed every shift.  The Outcome Evaluation is a brief statement about your assessment that the patient is improving, declining, or no change.  This information will be displayed automatically on your shift note.  Outcome: Met  Goal: Patient-Specific Goal (Individualized)  Description: You can add care plan individualizations to a care plan. Examples of Individualization might be:  \"Parent requests to be called daily at 9am for status\", \"I have a hard time hearing out of my right ear\", or \"Do not touch me to wake me up as it startles me\".  Outcome: Met  Goal: Absence of Hospital-Acquired Illness or Injury  Outcome: Met  Goal: Optimal Comfort and Wellbeing  Outcome: Met  Intervention: Provide Person-Centered Care  Recent Flowsheet Documentation  Taken 2/21/2023 0852 by Mehreen Brenner, RN  Trust Relationship/Rapport:   questions answered   questions encouraged   choices provided   care explained  Goal: Readiness for Transition of Care  Outcome: Met     Problem: Postpartum (Vaginal Delivery)  Goal: Successful Maternal Role Transition  Outcome: Met  Goal: Absence of Infection Signs and Symptoms  Outcome: Met  Goal: Anesthesia/Sedation Recovery  Outcome: Met  Goal: Optimal Pain Control and Function  Outcome: Met     "

## 2023-02-21 NOTE — LACTATION NOTE
Lactation consultant to patient room for feeding attempt. Baby not in the room. Instructed parents to call me for next feeding, and they stated understanding.

## 2023-02-21 NOTE — DISCHARGE SUMMARY
Maternal Discharge Summary  [facility]  Date of Service: 2023    Name      Ericka Hernández         1990  MRN       [id number]  PCP        Jose Alberto Hutchins    Admit Date:  2023  Discharge Date:  2023    Principal Diagnosis:    Patient Active Problem List   Diagnosis     Vaginal delivery     Postpartum care and examination of lactating mother       Delivery Type: vaginal, spontaneous     Hospital Course:  Ericka Hernández is a 33 year old now  s/p vaginal, spontaneous  at 40w6d on 23.  The patient's hospital course was unremarkable.  She recovered as anticipated and experienced no post-delivery complications.  On discharge, her pain was well controlled.  Vaginal bleeding is normal.  Voiding without difficulty.  Ambulating well and tolerating a normal diet.  No fevers.       Conditions complicating Pregnancy:  none    Procedure(s) Performed:     Discharge Plan:   1. Discharge to Home. Condition at Discharge:  stable  2. Physical activity: Regular.  3. Diet:  Regular.  4. Home care nurse: declined by patient.  5. Contraception plan: undecided, will follow up at postpartum visit.  6. Follow up with Jose Alberto Pagan in 6 weeks.    Discharge Medications:   Current Discharge Medication List      START taking these medications    Details   acetaminophen (TYLENOL) 325 MG tablet Take 2 tablets (650 mg) by mouth every 4 hours as needed for mild pain or fever (greater than or equal to 38  C /100.4  F (oral) or 38.5  C/ 101.4  F (core).)    Associated Diagnoses: Vaginal delivery      docusate sodium (COLACE) 100 MG capsule Take 1 capsule (100 mg) by mouth daily    Associated Diagnoses: Vaginal delivery      ibuprofen (ADVIL/MOTRIN) 800 MG tablet Take 1 tablet (800 mg) by mouth every 6 hours as needed for other (cramping)    Associated Diagnoses: Vaginal delivery      lanolin ointment Apply topically every hour as needed for other (sore  "nipples)    Associated Diagnoses: Vaginal delivery      witch hazel-glycerin (TUCKS) pad Apply topically every hour as needed for hemorrhoids or other (episiotomy pain/itching)    Associated Diagnoses: Vaginal delivery         CONTINUE these medications which have NOT CHANGED    Details   Prenatal Vit-Fe Fumarate-FA (PNV PRENATAL PLUS MULTIVITAMIN) 27-1 MG TABS per tablet Take 1 tablet by mouth daily             Allergies: No Known Allergies    Discharge Exam:  /82 (BP Location: Right arm, Patient Position: Sitting, Cuff Size: Adult Regular)   Pulse 84   Temp 98.7  F (37.1  C) (Oral)   Resp 18   Ht 1.702 m (5' 7\")   Wt 97.5 kg (215 lb)   SpO2 97%   Breastfeeding Yes   BMI 33.67 kg/m    General - alert, comfortable  Heart - RRR, no murmurs  Lungs - CTA bilaterally  Abdomen - fundus firm, nontender, below umbilicus  Extremities - trace edema    Post-partum hemoglobin:   Hemoglobin   Date Value Ref Range Status   02/20/2023 11.5 (L) 11.7 - 15.7 g/dL Final         Completed by:   Paola Ozuna MD  [facility]  2/21/2023 11:18 AM    "

## 2023-02-21 NOTE — PROGRESS NOTES
"Maternal Postpartum Progress Note  [facility]  Date of Service: 2023    Name      Ericka Hernández         1990  MRN       [id number]  PCP        Jose Alberto Hutchins        Subjective:  The patient feels well:  Voiding without difficulty, lochia normal, tolerating normal diet, and passing flatus.  Pain is well controlled with current medications.  The patient has no emotional concerns.  No calf pain or swelling.  The baby is well and being fed by breast.    Objective:  /82 (BP Location: Right arm, Patient Position: Sitting, Cuff Size: Adult Regular)   Pulse 84   Temp 98.7  F (37.1  C) (Oral)   Resp 18   Ht 1.702 m (5' 7\")   Wt 97.5 kg (215 lb)   SpO2 97%   Breastfeeding Yes   BMI 33.67 kg/m    Lochia is minimal.  The uterine fundus is firm at umbilicus.  Urinary output is adequate. No calf tenderness.  No edema.    Labs:  Hemoglobin   Date Value Ref Range Status   2023 11.5 (L) 11.7 - 15.7 g/dL Final       Assessment:    -Postpartum Day 2 s/p vaginal delivery  -Normal postpartum course.      Plan:    -Continue current care.  -Discharge home    Completed by:   Paola Ozuna MD, M.D.  [facility]  2023 10:37 AM    "

## 2023-02-21 NOTE — PLAN OF CARE
"Care Plan Progress Note      Name: Ericka Hernández  : 1990  MRN: [id number]    VS: /58   Pulse 89   Temp 97.5  F (36.4  C) (Oral)   Resp 18   Ht 1.702 m (5' 7\")   Wt 97.5 kg (215 lb)   SpO2 96%   Breastfeeding Yes   BMI 33.67 kg/m    VSS pain controlled with tucks, dermaplast and po meds.  Lochia light, breast feeding infant with use of nipple shield.  Bonding well with baby.  PPM done     Ame Butler, RN  2023  4:56 AM      "

## 2023-02-21 NOTE — PROGRESS NOTES
Pt discharged to home at 1330. Writer reviewed AVS with pt including the warning signs and pt verbalized understanding.  Pt has her belonging and going home with her baby. FOB is driving them home.  NA escorted pt out of the hospital.

## 2023-02-21 NOTE — PROGRESS NOTES
Outreach Nurse Note    Ericka LYNN Hernández  [id number]  1990    Chart reviewed, follow-up plan discussed with discharging phyician, and bedside RN, needs assessed. Post-delivery follow-up appointment with  planned 6 weeks at [facility]. Patient, Nirali, declined home nurse visit, per physician.      Nirali is reported to have support at home and is ready to discharge today with . Outreach nurse will continue to follow and assist with discharge planning as needed. No additional needs identified at this time.

## 2023-05-13 ENCOUNTER — HEALTH MAINTENANCE LETTER (OUTPATIENT)
Age: 33
End: 2023-05-13

## 2024-07-20 ENCOUNTER — HEALTH MAINTENANCE LETTER (OUTPATIENT)
Age: 34
End: 2024-07-20

## 2025-08-09 ENCOUNTER — HEALTH MAINTENANCE LETTER (OUTPATIENT)
Age: 35
End: 2025-08-09